# Patient Record
Sex: FEMALE | Race: WHITE | URBAN - METROPOLITAN AREA
[De-identification: names, ages, dates, MRNs, and addresses within clinical notes are randomized per-mention and may not be internally consistent; named-entity substitution may affect disease eponyms.]

---

## 2017-01-03 ENCOUNTER — OFFICE VISIT (OUTPATIENT)
Dept: PLASTIC SURGERY | Facility: CLINIC | Age: 32
End: 2017-01-03

## 2017-01-03 VITALS
SYSTOLIC BLOOD PRESSURE: 123 MMHG | DIASTOLIC BLOOD PRESSURE: 59 MMHG | WEIGHT: 164.1 LBS | OXYGEN SATURATION: 98 % | HEART RATE: 55 BPM | BODY MASS INDEX: 27.34 KG/M2 | TEMPERATURE: 98.2 F | HEIGHT: 65 IN

## 2017-01-03 DIAGNOSIS — F64.0 GENDER DYSPHORIA IN ADOLESCENT AND ADULT: Primary | ICD-10-CM

## 2017-01-03 RX ORDER — TESTOSTERONE CYPIONATE 200 MG/ML
0.3 INJECTION, SOLUTION INTRAMUSCULAR WEEKLY
COMMUNITY

## 2017-01-03 ASSESSMENT — PAIN SCALES - GENERAL: PAINLEVEL: NO PAIN (0)

## 2017-01-03 NOTE — Clinical Note
Laila- needs letter. Jeanine -sending preop worksheet but DO NOT SCHEDULE until PA done. Alison - will need preop mammogram
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
## 2017-01-03 NOTE — NURSING NOTE
"Chief Complaint   Patient presents with     Consult     consult       Filed Vitals:    01/03/17 0915   BP: 123/59   Pulse: 55   Temp: 98.2  F (36.8  C)   Height: 5' 5\"   Weight: 164 lb 1.6 oz   SpO2: 98%       Body mass index is 27.31 kg/(m^2).    Rolando Rivera MA                            "

## 2017-01-23 DIAGNOSIS — F64.0 TRANSSEXUALISM: ICD-10-CM

## 2017-01-23 DIAGNOSIS — F64.0 GENDER DYSPHORIA IN ADOLESCENT AND ADULT: Primary | ICD-10-CM
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
## 2017-02-07 DIAGNOSIS — F64.0 GENDER DYSPHORIA IN ADOLESCENT AND ADULT: Primary | ICD-10-CM

## 2017-02-20 ENCOUNTER — TELEPHONE (OUTPATIENT)
Dept: PLASTIC SURGERY | Facility: CLINIC | Age: 32
End: 2017-02-20

## 2017-02-20 NOTE — TELEPHONE ENCOUNTER
----- Message from Jeanine Soriano sent at 2/14/2017 12:27 PM CST -----  Regarding: FW: PRE AND POST OP QUESTIONS  Contact: [phone]  Alison can you take care of this..    ThanksJeanine  ----- Message -----     From: Vern Chatman     Sent: 2/14/2017  12:25 PM       To: Jeanine Soriano  Subject: PRE AND POST OP QUESTIONS                        Brandyn Lugo is requesting a call back regarding when he should set up appointments for his pre and post op appointments. He also has questions about his FMLA. Please call him at [phone] (ok to leave a message)      ThanksAmber in Call Center

## 2017-03-09 ENCOUNTER — TRANSFERRED RECORDS (OUTPATIENT)
Dept: HEALTH INFORMATION MANAGEMENT | Facility: CLINIC | Age: 32
End: 2017-03-09

## 2017-03-14 ENCOUNTER — OFFICE VISIT (OUTPATIENT)
Dept: PLASTIC SURGERY | Facility: CLINIC | Age: 32
End: 2017-03-14

## 2017-03-14 DIAGNOSIS — F64.0 GENDER DYSPHORIA IN ADOLESCENT AND ADULT: Primary | ICD-10-CM

## 2017-03-14 NOTE — PROGRESS NOTES
PLASTICS PREOP  This is a 31-year-old female-to-male transgender patient who is scheduled for bilateral subcu mastectomies with nipple grafts on 03/29.  He has already had his H&P and mammogram done at [facility] on 03/19, and they are visible on the Care Everywhere, but he will try and have it faxed again for easy access to the hospital.  His mammogram was negative.  We reviewed all the possible risks and complications as well do's and don'ts before and after surgery.  His female partner will be caring for him, but it sounds like she is a little bit anxious and possibly queasy.  We talked about what to expect the day of.  He was given surgical soap for preop shower and should be n.p.o. after midnight.  He only takes a couple meds, bupropion and Claritin, so he will need to take those with a sip of water the morning.  He needs to wear loose fitting clothing with either a zipper or button front top.  We talked about prevention for infection, bleeding, hematoma, seroma formation, DVT and PE and healing issues with regards to activity.  He should not lift more than 5 pounds.  He should limit his use of his upper extremities.  As far as dressings are concerned, the JPs need to be stripped twice daily and recorded.  He will have the On-Q catheters.  There will be Dermabond and Prineo on his incisions to last 2-3 weeks.  He will have nipple graft bolster dressings that will need to remain dry for the week after surgery, so no showering.  Ace wraps for circumferential compression of the thorax to be remapped as needed if too tight or too loose.  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  He had some additional questions as far as when would be okay to travel.  He is taking 6 weeks off since he needs to be able to move patient and does want a lifting restriction.  I think after about 3 weeks he should be fine other than carrying luggage.  He also has an  "interest when he can get back to lying on his front for an extended period of time over 4 hours.  I think after about 8 weeks postop, he should be fine with that.  We talked about medications.  He will receive something for pain such as oxy, Zofran and Atarax and a Z-TUAN.  Unfortunately, he did try oxy once and had a \"rash\" but he is willing to try it again.  Otherwise, he had Vicodin before but had a lot of GI upset.  After we reviewed our usual presentation and all of his questions and concerns, he seemed satisfied and ready to proceed.  We will see him on the 29th.      Total time spent with the patient was 20 minutes, all of which was spent in educating the patient and his caregiver about the upcoming surgery.       "

## 2017-03-14 NOTE — LETTER
3/14/2017       RE: Brandyn Tabor  [address]  [facility] [address]     Dear Colleague,    Thank you for referring your patient, Brandyn Tabor, to the [facility] PLASTIC AND RECONSTRUCTIVE SURGERY at [facility]. Please see a copy of my visit note below.    PLASTICS PREOP  This is a 31-year-old female-to-male transgender patient who is scheduled for bilateral subcu mastectomies with nipple grafts on 03/29.  He has already had his H&P and mammogram done at [facility] on 03/19, and they are visible on the Care Everywhere, but he will try and have it faxed again for easy access to the hospital.  His mammogram was negative.  We reviewed all the possible risks and complications as well do's and don'ts before and after surgery.  His female partner will be caring for him, but it sounds like she is a little bit anxious and possibly queasy.  We talked about what to expect the day of.  He was given surgical soap for preop shower and should be n.p.o. after midnight.  He only takes a couple meds, bupropion and Claritin, so he will need to take those with a sip of water the morning.  He needs to wear loose fitting clothing with either a zipper or button front top.  We talked about prevention for infection, bleeding, hematoma, seroma formation, DVT and PE and healing issues with regards to activity.  He should not lift more than 5 pounds.  He should limit his use of his upper extremities.  As far as dressings are concerned, the JPs need to be stripped twice daily and recorded.  He will have the On-Q catheters.  There will be Dermabond and Prineo on his incisions to last 2-3 weeks.  He will have nipple graft bolster dressings that will need to remain dry for the week after surgery, so no showering.  Ace wraps for circumferential compression of the thorax to be remapped as needed if too tight or too loose.  This patient actually works in [employer] at [employer], so he is  "very comfortable with medical information and cares.  He had some additional questions as far as when would be okay to travel.  He is taking 6 weeks off since he needs to be able to move patient and does want a lifting restriction.  I think after about 3 weeks he should be fine other than carrying luggage.  He also has an interest when he can get back to lying on his front for an extended period of time over 4 hours.  I think after about 8 weeks postop, he should be fine with that.  We talked about medications.  He will receive something for pain such as oxy, Zofran and Atarax and a Z-TUAN.  Unfortunately, he did try oxy once and had a \"rash\" but he is willing to try it again.  Otherwise, he had Vicodin before but had a lot of GI upset.  After we reviewed our usual presentation and all of his questions and concerns, he seemed satisfied and ready to proceed.  We will see him on the 29th.      Total time spent with the patient was 20 minutes, all of which was spent in educating the patient and his caregiver about the upcoming surgery.     Again, thank you for allowing me to participate in the care of your patient.      Sincerely,    Vanesa Love MD      "

## 2017-03-14 NOTE — MR AVS SNAPSHOT
After Visit Summary   3/14/2017    Brandyn Tabor    MRN: [id number]           Patient Information     Date Of Birth          1985        Visit Information        Provider Department      3/14/2017 10:30 AM Vanesa Love MD [facility] Plastic and Reconstructive Surgery        Today's Diagnoses     Gender dysphoria in adolescent and adult    -  1       Follow-ups after your visit        Your next 10 appointments already scheduled     Mar 29, 2017   Procedure with Vanesa Love MD   [facility], [place], Same Day Surgery (--)    [address]  [facility] [id number] [phone]            Apr 04, 2017 10:30 AM CDT   (Arrive by 10:15 AM)   Post-Op with MD CHAN [facility] Plastic and Reconstructive Surgery ([facility] [place])    [address] [address] [phone]              Who to contact     Please call your clinic at [phone] to:    Ask questions about your health    Make or cancel appointments    Discuss your medicines    Learn about your test results    Speak to your doctor   If you have compliments or concerns about an experience at your clinic, or if you wish to file a complaint, please contact [facility] Patient Relations at [phone] or email us at [email or web address]         Additional Information About Your Visit        MyChart Information     [employer] gives you secure access to your electronic health record. If you see a primary care provider, you can also send messages to your care team and make appointments. If you have questions, please call your primary care clinic.  If you do not have a primary care provider, please call [phone] and they will assist you.      [employer] is an electronic gateway that provides easy, online access to your medical records. With [employer], you can request a clinic appointment, read your test results, renew a  prescription or communicate with your care team.     To access your existing account, please contact your [facility] or call [phone] for assistance.        Care EveryWhere ID     This is your Care EveryWhere ID. This could be used by other organizations to access your [facility] medical records  [id number]         Blood Pressure from Last 3 Encounters:   01/03/17 123/59    Weight from Last 3 Encounters:   01/03/17 164 lb 1.6 oz   11/14/06 160 lb              Today, you had the following     No orders found for display       Primary Care Provider Office Phone # Fax #    Elodia Jha [phone]       [facility] [address] [facility] [address]        Thank you!     Thank you for choosing [facility] PLASTIC AND RECONSTRUCTIVE SURGERY  for your care. Our goal is always to provide you with excellent care. Hearing back from our patients is one way we can continue to improve our services. Please take a few minutes to complete the written survey that you may receive in the mail after your visit with us. Thank you!             Your Updated Medication List - Protect others around you: Learn how to safely use, store and throw away your medicines at www.disposemymeds.org.          This list is accurate as of: 3/14/17 11:59 PM.  Always use your most recent med list.                   Brand Name Dispense Instructions for use    ALBUTEROL IN      None Entered       AMBIEN PO      1 TABLET AT BEDTIME AS NEEDED       BUSPIRONE HCL PO      Take 10 mg by mouth       HYDROXYZINE PAMOATE PO      Take 25 mg by mouth       isometheptene-dichloralphenazone-acetaminophen -325 MG per capsule    MIDRIN         order for DME     1    Use as directed       testosterone cypionate 200 MG/ML injection    DEPOTESTOTERONE CYPIONATE     Inject 0.3 mLs into the muscle once a week       vitamin D3 1000 UNITS Caps      Take 1,000 Units by mouth

## 2017-03-29 ENCOUNTER — SURGERY (OUTPATIENT)
Age: 32
End: 2017-03-29

## 2017-03-29 ENCOUNTER — ANESTHESIA EVENT (OUTPATIENT)
Dept: SURGERY | Facility: CLINIC | Age: 32
End: 2017-03-29
Payer: COMMERCIAL

## 2017-03-29 ENCOUNTER — HOSPITAL ENCOUNTER (OUTPATIENT)
Facility: CLINIC | Age: 32
Discharge: HOME | End: 2017-03-29
Attending: SURGERY | Admitting: SURGERY
Payer: COMMERCIAL

## 2017-03-29 ENCOUNTER — ANESTHESIA (OUTPATIENT)
Dept: SURGERY | Facility: CLINIC | Age: 32
End: 2017-03-29
Payer: COMMERCIAL

## 2017-03-29 VITALS
BODY MASS INDEX: 26.81 KG/M2 | SYSTOLIC BLOOD PRESSURE: 122 MMHG | HEART RATE: 63 BPM | HEIGHT: 65 IN | RESPIRATION RATE: 18 BRPM | TEMPERATURE: 98.2 F | OXYGEN SATURATION: 96 % | DIASTOLIC BLOOD PRESSURE: 67 MMHG | WEIGHT: 160.94 LBS

## 2017-03-29 DIAGNOSIS — Z90.13 S/P BILATERAL MASTECTOMY: Primary | ICD-10-CM

## 2017-03-29 LAB — HCG UR QL: NEGATIVE

## 2017-03-29 PROCEDURE — 37000008 ZZH ANESTHESIA TECHNICAL FEE, 1ST 30 MIN: Performed by: SURGERY

## 2017-03-29 PROCEDURE — 36000057 ZZH SURGERY LEVEL 3 1ST 30 MIN - UMMC: Performed by: SURGERY

## 2017-03-29 PROCEDURE — 25000128 H RX IP 250 OP 636: Performed by: NURSE ANESTHETIST, CERTIFIED REGISTERED

## 2017-03-29 PROCEDURE — 88305 TISSUE EXAM BY PATHOLOGIST: CPT | Mod: 26 | Performed by: SURGERY

## 2017-03-29 PROCEDURE — 25800025 ZZH RX 258: Performed by: ANESTHESIOLOGY

## 2017-03-29 PROCEDURE — 25000125 ZZHC RX 250: Performed by: SURGERY

## 2017-03-29 PROCEDURE — 37000009 ZZH ANESTHESIA TECHNICAL FEE, EACH ADDTL 15 MIN: Performed by: SURGERY

## 2017-03-29 PROCEDURE — 27210794 ZZH OR GENERAL SUPPLY STERILE: Performed by: SURGERY

## 2017-03-29 PROCEDURE — 40000170 ZZH STATISTIC PRE-PROCEDURE ASSESSMENT II: Performed by: SURGERY

## 2017-03-29 PROCEDURE — 71000014 ZZH RECOVERY PHASE 1 LEVEL 2 FIRST HR: Performed by: SURGERY

## 2017-03-29 PROCEDURE — 25000128 H RX IP 250 OP 636: Performed by: SURGERY

## 2017-03-29 PROCEDURE — 25000128 H RX IP 250 OP 636: Performed by: ANESTHESIOLOGY

## 2017-03-29 PROCEDURE — 27110038 ZZH RX 271: Performed by: SURGERY

## 2017-03-29 PROCEDURE — 25000132 ZZH RX MED GY IP 250 OP 250 PS 637: Performed by: ANESTHESIOLOGY

## 2017-03-29 PROCEDURE — 25000566 ZZH SEVOFLURANE, EA 15 MIN: Performed by: SURGERY

## 2017-03-29 PROCEDURE — 71000027 ZZH RECOVERY PHASE 2 EACH 15 MINS: Performed by: SURGERY

## 2017-03-29 PROCEDURE — 71000015 ZZH RECOVERY PHASE 1 LEVEL 2 EA ADDTL HR: Performed by: SURGERY

## 2017-03-29 PROCEDURE — 36000059 ZZH SURGERY LEVEL 3 EA 15 ADDTL MIN UMMC: Performed by: SURGERY

## 2017-03-29 PROCEDURE — S0020 INJECTION, BUPIVICAINE HYDRO: HCPCS | Performed by: SURGERY

## 2017-03-29 PROCEDURE — 81025 URINE PREGNANCY TEST: CPT | Performed by: ANESTHESIOLOGY

## 2017-03-29 PROCEDURE — 88305 TISSUE EXAM BY PATHOLOGIST: CPT | Performed by: SURGERY

## 2017-03-29 PROCEDURE — 25000125 ZZHC RX 250: Performed by: NURSE ANESTHETIST, CERTIFIED REGISTERED

## 2017-03-29 PROCEDURE — 25000132 ZZH RX MED GY IP 250 OP 250 PS 637: Performed by: SURGERY

## 2017-03-29 RX ORDER — OXYCODONE HYDROCHLORIDE 5 MG/1
5-10 TABLET ORAL EVERY 4 HOURS PRN
Qty: 50 TABLET | Refills: 0 | Status: SHIPPED | OUTPATIENT
Start: 2017-03-29 | End: 2017-04-04

## 2017-03-29 RX ORDER — LIDOCAINE 40 MG/G
CREAM TOPICAL
Status: DISCONTINUED | OUTPATIENT
Start: 2017-03-29 | End: 2017-03-29 | Stop reason: HOSPADM

## 2017-03-29 RX ORDER — HYDROMORPHONE HYDROCHLORIDE 1 MG/ML
.3-.5 INJECTION, SOLUTION INTRAMUSCULAR; INTRAVENOUS; SUBCUTANEOUS EVERY 10 MIN PRN
Status: DISCONTINUED | OUTPATIENT
Start: 2017-03-29 | End: 2017-03-29 | Stop reason: HOSPADM

## 2017-03-29 RX ORDER — SODIUM CHLORIDE, SODIUM LACTATE, POTASSIUM CHLORIDE, CALCIUM CHLORIDE 600; 310; 30; 20 MG/100ML; MG/100ML; MG/100ML; MG/100ML
INJECTION, SOLUTION INTRAVENOUS CONTINUOUS
Status: DISCONTINUED | OUTPATIENT
Start: 2017-03-29 | End: 2017-03-29 | Stop reason: HOSPADM

## 2017-03-29 RX ORDER — FENTANYL CITRATE 50 UG/ML
INJECTION, SOLUTION INTRAMUSCULAR; INTRAVENOUS PRN
Status: DISCONTINUED | OUTPATIENT
Start: 2017-03-29 | End: 2017-03-29

## 2017-03-29 RX ORDER — CEFAZOLIN SODIUM 2 G/100ML
2 INJECTION, SOLUTION INTRAVENOUS
Status: COMPLETED | OUTPATIENT
Start: 2017-03-29 | End: 2017-03-29

## 2017-03-29 RX ORDER — CEFAZOLIN SODIUM 2 G/100ML
2 INJECTION, SOLUTION INTRAVENOUS
Status: DISCONTINUED | OUTPATIENT
Start: 2017-03-29 | End: 2017-03-29 | Stop reason: HOSPADM

## 2017-03-29 RX ORDER — GLYCOPYRROLATE 0.2 MG/ML
INJECTION, SOLUTION INTRAMUSCULAR; INTRAVENOUS PRN
Status: DISCONTINUED | OUTPATIENT
Start: 2017-03-29 | End: 2017-03-29

## 2017-03-29 RX ORDER — ONDANSETRON 4 MG/1
4 TABLET, ORALLY DISINTEGRATING ORAL EVERY 30 MIN PRN
Status: DISCONTINUED | OUTPATIENT
Start: 2017-03-29 | End: 2017-03-29 | Stop reason: HOSPADM

## 2017-03-29 RX ORDER — NALOXONE HYDROCHLORIDE 0.4 MG/ML
.1-.4 INJECTION, SOLUTION INTRAMUSCULAR; INTRAVENOUS; SUBCUTANEOUS
Status: DISCONTINUED | OUTPATIENT
Start: 2017-03-29 | End: 2017-03-29 | Stop reason: HOSPADM

## 2017-03-29 RX ORDER — ONDANSETRON 4 MG/1
4 TABLET, ORALLY DISINTEGRATING ORAL EVERY 8 HOURS PRN
Qty: 20 TABLET | Refills: 1 | Status: SHIPPED | OUTPATIENT
Start: 2017-03-29 | End: 2017-06-06

## 2017-03-29 RX ORDER — LIDOCAINE HYDROCHLORIDE 20 MG/ML
INJECTION, SOLUTION INFILTRATION; PERINEURAL PRN
Status: DISCONTINUED | OUTPATIENT
Start: 2017-03-29 | End: 2017-03-29

## 2017-03-29 RX ORDER — CEFAZOLIN SODIUM 1 G/3ML
1 INJECTION, POWDER, FOR SOLUTION INTRAMUSCULAR; INTRAVENOUS SEE ADMIN INSTRUCTIONS
Status: DISCONTINUED | OUTPATIENT
Start: 2017-03-29 | End: 2017-03-29 | Stop reason: HOSPADM

## 2017-03-29 RX ORDER — NEOSTIGMINE METHYLSULFATE 1 MG/ML
VIAL (ML) INJECTION PRN
Status: DISCONTINUED | OUTPATIENT
Start: 2017-03-29 | End: 2017-03-29

## 2017-03-29 RX ORDER — ONDANSETRON 2 MG/ML
INJECTION INTRAMUSCULAR; INTRAVENOUS PRN
Status: DISCONTINUED | OUTPATIENT
Start: 2017-03-29 | End: 2017-03-29

## 2017-03-29 RX ORDER — MEPERIDINE HYDROCHLORIDE 25 MG/ML
12.5 INJECTION INTRAMUSCULAR; INTRAVENOUS; SUBCUTANEOUS
Status: DISCONTINUED | OUTPATIENT
Start: 2017-03-29 | End: 2017-03-29 | Stop reason: HOSPADM

## 2017-03-29 RX ORDER — OXYCODONE HYDROCHLORIDE 5 MG/1
5 TABLET ORAL ONCE
Status: COMPLETED | OUTPATIENT
Start: 2017-03-29 | End: 2017-03-29

## 2017-03-29 RX ORDER — BUPIVACAINE HYDROCHLORIDE 5 MG/ML
INJECTION, SOLUTION PERINEURAL PRN
Status: DISCONTINUED | OUTPATIENT
Start: 2017-03-29 | End: 2017-03-29 | Stop reason: HOSPADM

## 2017-03-29 RX ORDER — PROPOFOL 10 MG/ML
INJECTION, EMULSION INTRAVENOUS PRN
Status: DISCONTINUED | OUTPATIENT
Start: 2017-03-29 | End: 2017-03-29

## 2017-03-29 RX ORDER — ONDANSETRON 2 MG/ML
4 INJECTION INTRAMUSCULAR; INTRAVENOUS EVERY 30 MIN PRN
Status: DISCONTINUED | OUTPATIENT
Start: 2017-03-29 | End: 2017-03-29 | Stop reason: HOSPADM

## 2017-03-29 RX ORDER — ACETAMINOPHEN 325 MG/1
975 TABLET ORAL ONCE
Status: COMPLETED | OUTPATIENT
Start: 2017-03-29 | End: 2017-03-29

## 2017-03-29 RX ORDER — AZITHROMYCIN 250 MG/1
250 TABLET, FILM COATED ORAL DAILY
Qty: 6 TABLET | Refills: 0 | Status: SHIPPED | OUTPATIENT
Start: 2017-03-29 | End: 2017-04-04

## 2017-03-29 RX ORDER — GABAPENTIN 100 MG/1
300 CAPSULE ORAL ONCE
Status: COMPLETED | OUTPATIENT
Start: 2017-03-29 | End: 2017-03-29

## 2017-03-29 RX ADMIN — NEOSTIGMINE METHYLSULFATE 3.5 MG: 1 INJECTION INTRAMUSCULAR; INTRAVENOUS; SUBCUTANEOUS at 12:20

## 2017-03-29 RX ADMIN — ONDANSETRON 4 MG: 2 INJECTION INTRAMUSCULAR; INTRAVENOUS at 12:40

## 2017-03-29 RX ADMIN — ACETAMINOPHEN 975 MG: 325 TABLET, FILM COATED ORAL at 07:00

## 2017-03-29 RX ADMIN — FENTANYL CITRATE 50 MCG: 50 INJECTION, SOLUTION INTRAMUSCULAR; INTRAVENOUS at 11:26

## 2017-03-29 RX ADMIN — GABAPENTIN 300 MG: 300 CAPSULE ORAL at 07:01

## 2017-03-29 RX ADMIN — CEFAZOLIN SODIUM 2 G: 2 INJECTION, SOLUTION INTRAVENOUS at 08:02

## 2017-03-29 RX ADMIN — SODIUM CHLORIDE, POTASSIUM CHLORIDE, SODIUM LACTATE AND CALCIUM CHLORIDE: 600; 310; 30; 20 INJECTION, SOLUTION INTRAVENOUS at 12:20

## 2017-03-29 RX ADMIN — PROCHLORPERAZINE EDISYLATE 5 MG: 5 INJECTION INTRAMUSCULAR; INTRAVENOUS at 14:02

## 2017-03-29 RX ADMIN — BUPIVACAINE HYDROCHLORIDE 4 ML: 5 INJECTION, SOLUTION PERINEURAL at 09:46

## 2017-03-29 RX ADMIN — Medication 50 MG: at 07:43

## 2017-03-29 RX ADMIN — MIDAZOLAM HYDROCHLORIDE 2 MG: 1 INJECTION, SOLUTION INTRAMUSCULAR; INTRAVENOUS at 07:38

## 2017-03-29 RX ADMIN — HYDROMORPHONE HYDROCHLORIDE 0.3 MG: 1 INJECTION, SOLUTION INTRAMUSCULAR; INTRAVENOUS; SUBCUTANEOUS at 13:10

## 2017-03-29 RX ADMIN — Medication: at 12:32

## 2017-03-29 RX ADMIN — GLYCOPYRROLATE 0.5 MG: 0.2 INJECTION, SOLUTION INTRAMUSCULAR; INTRAVENOUS at 12:20

## 2017-03-29 RX ADMIN — ONDANSETRON 4 MG: 2 INJECTION INTRAMUSCULAR; INTRAVENOUS at 12:20

## 2017-03-29 RX ADMIN — SODIUM CHLORIDE, POTASSIUM CHLORIDE, SODIUM LACTATE AND CALCIUM CHLORIDE: 600; 310; 30; 20 INJECTION, SOLUTION INTRAVENOUS at 08:28

## 2017-03-29 RX ADMIN — LIDOCAINE HYDROCHLORIDE 60 MG: 20 INJECTION, SOLUTION INFILTRATION; PERINEURAL at 07:43

## 2017-03-29 RX ADMIN — MEPERIDINE HYDROCHLORIDE 12.5 MG: 25 INJECTION, SOLUTION INTRAMUSCULAR; INTRAVENOUS; SUBCUTANEOUS at 13:00

## 2017-03-29 RX ADMIN — OXYCODONE HYDROCHLORIDE 5 MG: 5 TABLET ORAL at 15:30

## 2017-03-29 RX ADMIN — PROPOFOL 140 MG: 10 INJECTION, EMULSION INTRAVENOUS at 07:43

## 2017-03-29 RX ADMIN — HYDROMORPHONE HYDROCHLORIDE 0.5 MG: 1 INJECTION, SOLUTION INTRAMUSCULAR; INTRAVENOUS; SUBCUTANEOUS at 13:34

## 2017-03-29 RX ADMIN — Medication 25 MG: at 09:45

## 2017-03-29 RX ADMIN — Medication 25 MG: at 08:55

## 2017-03-29 RX ADMIN — FENTANYL CITRATE 50 MCG: 50 INJECTION, SOLUTION INTRAMUSCULAR; INTRAVENOUS at 08:40

## 2017-03-29 RX ADMIN — GENTAMICIN SULFATE 1 BOTTLE: 40 INJECTION, SOLUTION INTRAMUSCULAR; INTRAVENOUS at 08:21

## 2017-03-29 RX ADMIN — Medication 50 MG: at 07:46

## 2017-03-29 RX ADMIN — FENTANYL CITRATE 100 MCG: 50 INJECTION, SOLUTION INTRAMUSCULAR; INTRAVENOUS at 07:43

## 2017-03-29 RX ADMIN — CEFAZOLIN SODIUM 1 G: 2 INJECTION, SOLUTION INTRAVENOUS at 10:09

## 2017-03-29 RX ADMIN — SODIUM CHLORIDE, POTASSIUM CHLORIDE, SODIUM LACTATE AND CALCIUM CHLORIDE: 600; 310; 30; 20 INJECTION, SOLUTION INTRAVENOUS at 07:38

## 2017-03-29 NOTE — BRIEF OP NOTE
Brief Op Note    Pre Op Diagnosis: female to male transgender, gender dysphoria  Post Op Diagnosis: same  Procedure: bilateral subcutaneous mastectomies with nipple grafts. OnQ  Anesthesia:GET  Surgeon: Angélica Love MD  Assistant: none  EBL: 50 ml  IV fluids:1900 mls LR  Urine output:100 mls  Counts: correct  Specimens: breast tissue sent to path for histologic examination  Drain: MILIND x 2  Condition: stable  Findings:na      Angélica Love MD

## 2017-03-29 NOTE — IP AVS SNAPSHOT
MRN:[id number]                      After Visit Summary   3/29/2017    Brandyn Tabor    MRN: [id number]           Thank you!     Thank you for choosing [facility] for your care. Our goal is always to provide you with excellent care. Hearing back from our patients is one way we can continue to improve our services. Please take a few minutes to complete the written survey that you may receive in the mail after you visit with us. Thank you!        Patient Information     Date Of Birth          1985        About your hospital stay     You were admitted on:  March 29, 2017 You last received care in the:   PACU    You were discharged on:  March 29, 2017        Reason for your hospital stay       S/p bilateral subcutaneous mastectomies with nipple grafts. OnQ pain catheters.  Tolerated under GA.  OK to dc home per anesthesia criteria.                  Who to Call     For medical emergencies, please call 911.  For non-urgent questions about your medical care, please call your primary care provider or clinic, [phone]  For questions related to your surgery, please call your surgery clinic        Attending Provider     Provider Specialty    Vanesa Love MD Plastic Surgery       Primary Care Provider Office Phone # Fax #    Elodia Jha [phone]       [facility] [address] [facility] [address]         When to contact your care team       Call Plastic Surgery Clinic during daytime hours (Alison: [phone], OR Bettye: [phone]), OR the hospital  for nights/ weekends ([phone]) to speak with plastic surgery on-call resident IF you have any of the following: temperature >102.5, increased bleeding noted in drains or under skin, reactions to meds, reactions to pain pump (tingling around lips or ringing in ears), any problems with drains/pain catheters/or dressings.                  After Care Instructions     Activity     "   Your activity upon discharge: no heavy lifting > 5 lbs x 3 weeks. AVOID excessive/ extreme use of upper body/ extremities x 3 weeks. OK to do gentle movements for daily cares.  AVOID direct trauma to surgical sites.  OK to sleep on your back or modified side position (may be uncomfortable with incisions and drains on the side). CANNOT sleep on stomach for at least the first 2 weeks.  May want to consider sleeping with pillows to prevent from rolling over.   Some patients prefer to sleep in a recliner to prevent rolling, but elevation is not required.            Diet       Follow this diet upon discharge: Advance to a regular diet as tolerated.  Drink plenty of fluids to help prevent constipation.  Get plenty of protein, vitamins and minerals (fruits and veggies)  to help with healing.  Can resume usual preop meds and supplements as tolerated.            Monitor and record       MILIND drain output EVERY morning and EVERY night.  Usually between 30-50 mls per day, but don't be alarmed is more or less. May not be equal between sides. Will probably drop off when pain pump is empty.  Usually fluid looks like cherry Koolaid at first, then Hawaiian punch color, then peach tea over time.  May notice protein \"clots\" that look like \"worms\" in the tubing. Do not be alarmed -this is just precipitated protein from the fluid that is weeping from your raw tissues, much like what you see when you scrape your knee.  We DO want to be notified IF: there is increasing amount of fresh bright red blood that rapidly fills the suction bulb after emptying. OR if blood collecting under the skin and causing significant painful swelling on one side (expanding hematoma).            Supplies       List the supplies the pt needs to go home:  PLEASE send supplies for MILIND drain cares.  Please instruct caregivers on drain cares.  Please send home with spare ACE wraps from OR.  THANK YOU            Tubes and drains       You are going home with the " following tubes or drains: MILIND.  Follow these instructions  to care for your tube.  STRIP tubing and MEASURE/RECORD output Qam & Qpm.  Please bring output record to follow up appointment.  MAKE SURE NURSES INSTRUCT ON DRAIN CARES BEFORE GOING HOME.            Wound care and dressings       Instructions to care for your wound at home: as directed.  Keep nipple graft dressings DRY. NO SHOWERS until after follow up appointment.   OK to rewrap ACE if too tight or too loose.   Do NOT mess with dressings underneath ACE wrap or nipple grafts dressings.                  Follow-up Appointments     Follow Up and recommended labs and tests       Follow up with me,  Vaneas Love, within 1 week at [address] [place] [address]. to evaluate after surgery.  No follow up labs or test are needed.                  Your next 10 appointments already scheduled     Apr 04, 2017 10:30 AM CDT   (Arrive by 10:15 AM)   Post-Op with Vanesa Love MD   [facility] Plastic and Reconstructive Surgery ([facility])    [address] [place] [address]  4th Floor  [facility] [id number] [phone]              Further instructions from your care team       Same-Day Surgery   Adult Discharge Orders & Instructions     For 24 hours after surgery:  1. Get plenty of rest.  A responsible adult must stay with you for at least 24 hours after you leave the hospital.   2. Pain medication can slow your reflexes. Do not drive or use heavy equipment.  If you have weakness or tingling, don't drive or use heavy equipment until this feeling goes away.  3. Mixing alcohol and pain medication can cause dizziness and slow your breathing. It can even be fatal. Do not drink alcohol while taking pain medication.  4. Avoid strenuous or risky activities.  Ask for help when climbing stairs.   5. You may feel lightheaded.  If so, sit for a few minutes before standing.  Have someone help you get up.   6. If you have nausea (feel sick to your stomach),  drink only clear liquids such as apple juice, ginger ale, broth or 7-Up.  Rest may also help.  Be sure to drink enough fluids.  Move to a regular diet as you feel able. Take pain medications with a small amount of solid food, such as toast or crackers, to avoid nausea.   7. A slight fever is normal. Call the doctor if your fever is over 100 F (37.7 C) (taken under the tongue) or lasts longer than 24 hours.  8. You may have a dry mouth, muscle aches, trouble sleeping or a sore throat.  These symptoms should go away after 24 hours.  9. Do not make important or legal decisions.   Pain Management:      1. Take pain medication (if prescribed) for pain as directed by your physician.        2. WARNING: If the pain medication you have been prescribed contains Tylenol  (acetaminophen), DO NOT take additional doses of Tylenol (acetaminophen).     Call your doctor for any of the followin.  Signs of infection (fever, growing tenderness at the surgery site, severe pain, a large amount of drainage or bleeding, foul-smelling drainage, redness, swelling).    2.  It has been over 8 to 10 hours since surgery and you are still not able to urinate (pee).    3.  Headache for over 24 hours.    4.  Numbness, tingling or weakness the day after surgery (if you had spinal anesthesia).  To contact a doctor, call _____________________________________ or:      [phone] and ask for the Resident On Call for:          __________________________________________ (answered 24 hours a day)      Emergency Department:  [facility]: [phone]  [facility]: [phone]      ON-Q  C-bloc Continuous Nerve Block Discharge Instructions  The Nerve Block:       Your anesthesiologist performed a nerve block (a procedure that blocks pain to only a specific area) by inserting a small catheter (tube) in your body.  This catheter is connected to tubing and to a pump that will help control your pain.  The  Medicine/Rate______________________________________________    The pump is shaped like a balloon and is filled with     medicine that causes numbness or loss of sensation to help control your pain.  The pain pump DOES NOT contain narcotics.      The medicine in the pump may alter your ability to feel changes in temperature or pressure.  Depending on where the catheter was placed, it may affect your ability to control movement.  After the first few hours you may get some soreness as well as movement back; this is normal.    The Pump      DO NOT SQUEEZE THE PUMP.     The pump delivers medicine at a very slow rate.    You will NOT see the medicine moving through the tubing.    As the medicine is delivered, the pump ball will slowly become smaller.    It may take a day or so before you notice a change in the size and look of the pump.    Depending on the size of your pump, it may take 2 - 5 days to give all the medicine.    The middle part of the pump may look like an apple core when empty.  Managing Your Pain    The continuous nerve block infusion may not block all of the pain from your surgery (and the benefits of the pump can vary from patient to patient).  It is important that you take the pain medicines prescribed by your surgeon if you need them.      If you continue to have difficulty with your pain control, please page or call the anesthesiologist.  Caring for Your Pump at Home    Wear the pump on the outside of clothing - away from your skin and cold therapy (ice packs).  The delivery rate is accurate only at room temperature.    Make sure the white clamp on the tubing remains open (moves freely on the tubing).    Make sure there are no kinks in the tubing.    DO NOT tape or cover up the filter.    Protect the pump from sunlight and heat.    When sleeping:   o DO NOT place the pump underneath the bed covers where the pump may become too warm.  o DO NOT place the pump on the floor or hang the pump on a bed post  as these situations may cause the tubing to get tangled and get pulled out.    Bathing/Showering:    o We recommend taking sponge baths until the pump is removed.  o Avoid getting the area where the catheter enters your body wet.  o DO NOT let water get in the filter.  o DO NOT submerge the pump in water.  Activity     DO NOT drive or operate heavy machinery if the nerve block affects an extremity    DO NOT bear weight on the affected extremity until sensation and motion return and directed by your physician    Elevate affected extremity; pillows work well for elevation.    Take care to avoid objects that may put pressure on or cause trauma to the limb.  Be careful when placing hot or cold objects on the numb area.    For Upper Extremity Nerve Blocks, using the non-operative extremity, you may do range of motion exercises hourly while awake unless directed otherwise.    For Knee Surgery patients with a Femoral or Adductor Canal catheter you must have an Immobilizer on at all times when up until the catheter is removed and full sensation and strength have returned.    For Lower Extremity Nerve Blocks make sure someone is with you the first time you attempt to place full weight on your operative side.  The Infusion    The infusion will be started by the Surgical Center.  DO NOT turn the infusion off unless your anesthesiologist has told you to do so.    DO NOT change the flow rate of the infusion unless your anesthesiologist told you to do so.  Changing the flow rate without your doctor s instruction may result in the wrong dose of medicine, which could cause serious injury.    If your anesthesiologist told you to change the rate of your infusion:  o Flip open the clear cover on the select-a-flow device.  o Turn the white key clockwise on the select-a-flow dial to the instructed rate.  (The rate of the infusion should line up with the black arrow at the top of the controller.)    o Listen for the click when you move  the dial.  o The key may be removed from the select-a-flow dial, or the plastic cover on the device may be zip tied shut to ensure safety.  Removing the Catheter    When the pump is empty or if you have been told to stop the infusion you can remove the catheter.  Follow these steps::  o Wash your hands.  o Clamp the tubing (squeeze the white clamp until you hear or feel a click).  o Remove the clear dressing that covers the tubing.  o Grasp the catheter close to the skin and gently pull.  If you meet resistance, STOP pulling and page or call your anesthesiologist.  o DO NOT cut or forcefully remove the catheter.  o After removal, check the end of the catheter for a dark tip.  If you DO NOT see a dark tip, page or call your anesthesiologist.  o Apply firm pressure over the site until oozing stops.  Wash the area with soap and water, dry with a clean towel and then cover with a bandage.   o The pump is not reusable or refillable.  Dispose of it in the trash and wash your hands.  Troubleshooting    Tubing Comes Out From Skin:  If the tube accidentally comes out, check the end of the tubing for a dark tip.    If you see a dark tip simply discard it and use the pain pills prescribed to you by your surgeon.    If you DON T see a dark tip, page or call the anesthesiologist.    Tubing Disconnection:  If the tubing accidentally becomes disconnected from the pump, DO NOT reconnect the pump to the tubing.  It may have been contaminated with germs.  Close the tubing clamp and immediately page or call your anesthesiologist.    Fluid Leaking:  If fluid is leaking from the site catheter insertion site, close the white clamp on the tubing and page or call your anesthesiologist.    Immediately report the following to your anesthesiologist:    Redness, warmth, swelling, or tenderness at the site the tubing was inserted    Increase in pain    Fever, chills, sweats    Bowel or bladder changes    Difficulty breathing    Dizziness,  lightheadedness    Blurred vision    Ringing or buzzing in your ears    Metal taste in your mouth    Numbness and/or tingling around your mouth, fingers or toes    Drowsiness    Confusion    Trouble removing the tubing    Dark tip is not present when tubing is removed    Notifying your Anesthesiologist  o Page:  Dial [phone], then enter [phone].  You will be prompted to enter your phone number and then the # sign.  The anesthesiologist will call you back.  o Call:  Dial [phone].  Ask to speak to the anesthesiologist on call for the [facility].                     Updated 1/2016  .u           Rev. 10/2014   Caring for Your Randal-Salguero Drain    You have been discharged with a Randal-Salguero drainage tube. This tube drains fluid from your incision, helping prevent swelling and reducing the risk for infection. The tube is held in place by a few stitches. The drain will be removed when your doctor determines you no longer need it and when the amount of drainage decreases. The color and amount of fluid varies. Right after surgery, the fluid may be bright red and may become clearer over time.   Dressing:    Keep the skin around the tube dry.    If you have a dressing, change it every day.   o Wash your hands.  o Remove the old bandage. Do not use scissors-you may accidentally cut the tube.  o Check for any redness, swelling, drainage, or broken stitches. (Call your doctor with any of these findings).  o Wash your hands again.  o Wet a cotton swab (Q-tip) and clean around the incision and the tube site. Use normal saline solution (salt and water) or soap and water. Start at the tube site and move outward in a circular motion.   o Pat dry.  o Put a new bandage on the incision and tube site. Make the bandage large enough to cover the whole incision area.   o Tape the bandage in place.  o Throw out old materials and wash your hands.   Home Care:    Tape the tube to the skin below the bandage. Make  sure to keep some slack in the tube to keep it from pulling out.     DO NOT sleep on the same side as the tube.    Secure the tube and bulb inside your clothing with a safety pin. This helps keep the tube from being pulled out.     Keep the bulb compressed at all times, except when you empty it.    Empty your drain at least twice a day. Empty it more often if the drain is full.   o Lift the opening of the drain.  o Drain the fluid into a measuring cup.  o Record the amount of fluid each time you empty. Share the information with your doctor at your follow-up visit.   o Squeeze the bulb with your hands until you hear air coming out of the bulb.  o Close the opening.     Tape plastic wrap over the bandage and tube site when you shower.      Stripping  the tube helps keep blood clots from blocking the tube.                         ONLY DO THIS IF YOUR MD INSTRUCTED YOU TO DO SO!  o Hold the tubing where it leaves the skin with one hand. This keeps it from pulling on the skin.  o Pinch the tubing with the thumb and first finger of your other hand.   o Slowly and firmly pull your thumb and first finger down the tube (squeezing the tube between your fingers). Keep squeezing the tube as you run your fingers towards the bulb. If the pulling hurts or feels like it is coming out of the skin, STOP. Begin again more gently.  o Let go of the tubing with both hands. If the tube is still blocked, repeat these steps three or four times. Make sure that the bulb is compressed so it creates suction.  When to call your doctor:    New or increased pain around the tube    Redness, warmth, or swelling around the incision or tube    Drainage that is foul smelling    Vomiting    Fever over 101 F degrees    Fluid leaking around the tube    Incision seems not to be healing    Stitches become loose    The tube falls out    Drainage that changes from light pick to dark red    A sudden increase or decrease in the amount of drainage (over 30  "ml).  Your drainage record:  Date Time Bulb 1: Amount of drainage (ml or cc) Bulb 2: Amount of drainage (ml or cc) Notes                                                                                            Rev. 4/2014      Pending Results     Date and Time Order Name Status Description    3/29/2017 1031 Surgical pathology exam In process             Admission Information     Date & Time Provider Department Dept. Phone    3/29/2017 Vanesa Love MD  PACU [phone]      Your Vitals Were     Blood Pressure Pulse Temperature Respirations Height Weight    126/66 63 98.6  F (37  C) (Oral) 15 1.651 m (5' 5\") 73 kg (160 lb 15 oz)    Pulse Oximetry BMI (Body Mass Index)                96% 26.78 kg/m2          [employer] Information     [employer] gives you secure access to your electronic health record. If you see a primary care provider, you can also send messages to your care team and make appointments. If you have questions, please call your primary care clinic.  If you do not have a primary care provider, please call [phone] and they will assist you.        Care EveryWhere ID     This is your Care EveryWhere ID. This could be used by other organizations to access your [place] medical records  [id number]           Review of your medicines      START taking        Dose / Directions    azithromycin 250 MG tablet   Commonly known as:  ZITHROMAX Z-TUAN   Used for:  S/P bilateral mastectomy        Dose:  250 mg   Take 1 tablet (250 mg) by mouth daily Take 2 tabs 1st day   Quantity:  6 tablet   Refills:  0       ondansetron 4 MG ODT tab   Commonly known as:  ZOFRAN ODT   Used for:  S/P bilateral mastectomy        Dose:  4 mg   Take 1 tablet (4 mg) by mouth every 8 hours as needed for nausea   Quantity:  20 tablet   Refills:  1       oxyCODONE 5 MG IR tablet   Commonly known as:  ROXICODONE   Used for:  S/P bilateral mastectomy        Dose:  5-10 mg   Take 1-2 tablets (5-10 mg) by mouth every 4 hours as " needed for moderate to severe pain   Quantity:  50 tablet   Refills:  0         CONTINUE these medicines which have NOT CHANGED        Dose / Directions    ALBUTEROL IN        PRN   Refills:  0       AMBIEN PO        1 TABLET AT BEDTIME AS NEEDED   Refills:  0       BUSPIRONE HCL PO        Dose:  10 mg   Take 10 mg by mouth daily   Refills:  0       HYDROXYZINE PAMOATE PO        Dose:  25 mg   Take 25 mg by mouth every 4 hours as needed   Refills:  0       isometheptene-dichloralphenazone-acetaminophen -325 MG per capsule   Commonly known as:  MIDRIN        Take by mouth as needed   Refills:  0       order for DME   Used for:  Sprain of ankle, unspecified site        Use as directed   Quantity:  1   Refills:  0       testosterone cypionate 200 MG/ML injection   Commonly known as:  DEPOTESTOTERONE CYPIONATE        Dose:  0.3 mL   Inject 0.3 mLs into the muscle once a week   Refills:  0       vitamin D3 1000 UNITS Caps        Dose:  1000 Units   Take 1,000 Units by mouth   Refills:  0            Where to get your medicines      These medications were sent to [facility] [address]  [address] [address]     Phone:  [phone]     azithromycin 250 MG tablet    ondansetron 4 MG ODT tab         Some of these will need a paper prescription and others can be bought over the counter. Ask your nurse if you have questions.     Bring a paper prescription for each of these medications     oxyCODONE 5 MG IR tablet                Protect others around you: Learn how to safely use, store and throw away your medicines at www.disposemymeds.org.             Medication List: This is a list of all your medications and when to take them. Check marks below indicate your daily home schedule. Keep this list as a reference.      Medications           Morning Afternoon Evening Bedtime As Needed    ALBUTEROL IN   PRN                                AMBIEN PO   1 TABLET AT  BEDTIME AS NEEDED                                azithromycin 250 MG tablet   Commonly known as:  ZITHROMAX Z-TUAN   Take 1 tablet (250 mg) by mouth daily Take 2 tabs 1st day                                BUSPIRONE HCL PO   Take 10 mg by mouth daily                                HYDROXYZINE PAMOATE PO   Take 25 mg by mouth every 4 hours as needed                                isometheptene-dichloralphenazone-acetaminophen -325 MG per capsule   Commonly known as:  MIDRIN   Take by mouth as needed                                ondansetron 4 MG ODT tab   Commonly known as:  ZOFRAN ODT   Take 1 tablet (4 mg) by mouth every 8 hours as needed for nausea                                order for DME   Use as directed                                oxyCODONE 5 MG IR tablet   Commonly known as:  ROXICODONE   Take 1-2 tablets (5-10 mg) by mouth every 4 hours as needed for moderate to severe pain                                testosterone cypionate 200 MG/ML injection   Commonly known as:  DEPOTESTOTERONE CYPIONATE   Inject 0.3 mLs into the muscle once a week                                vitamin D3 1000 UNITS Caps   Take 1,000 Units by mouth

## 2017-03-29 NOTE — DISCHARGE INSTRUCTIONS
Same-Day Surgery   Adult Discharge Orders & Instructions     For 24 hours after surgery:  1. Get plenty of rest.  A responsible adult must stay with you for at least 24 hours after you leave the hospital.   2. Pain medication can slow your reflexes. Do not drive or use heavy equipment.  If you have weakness or tingling, don't drive or use heavy equipment until this feeling goes away.  3. Mixing alcohol and pain medication can cause dizziness and slow your breathing. It can even be fatal. Do not drink alcohol while taking pain medication.  4. Avoid strenuous or risky activities.  Ask for help when climbing stairs.   5. You may feel lightheaded.  If so, sit for a few minutes before standing.  Have someone help you get up.   6. If you have nausea (feel sick to your stomach), drink only clear liquids such as apple juice, ginger ale, broth or 7-Up.  Rest may also help.  Be sure to drink enough fluids.  Move to a regular diet as you feel able. Take pain medications with a small amount of solid food, such as toast or crackers, to avoid nausea.   7. A slight fever is normal. Call the doctor if your fever is over 100 F (37.7 C) (taken under the tongue) or lasts longer than 24 hours.  8. You may have a dry mouth, muscle aches, trouble sleeping or a sore throat.  These symptoms should go away after 24 hours.  9. Do not make important or legal decisions.   Pain Management:      1. Take pain medication (if prescribed) for pain as directed by your physician.        2. WARNING: If the pain medication you have been prescribed contains Tylenol  (acetaminophen), DO NOT take additional doses of Tylenol (acetaminophen).     Call your doctor for any of the followin.  Signs of infection (fever, growing tenderness at the surgery site, severe pain, a large amount of drainage or bleeding, foul-smelling drainage, redness, swelling).    2.  It has been over 8 to 10 hours since surgery and you are still not able to urinate (pee).    3.   Headache for over 24 hours.    4.  Numbness, tingling or weakness the day after surgery (if you had spinal anesthesia).  To contact a doctor, call _____________________________________ or:      [phone] and ask for the Resident On Call for:          __________________________________________ (answered 24 hours a day)      Emergency Department:  [facility]: [phone]  [facility]: [phone]      ON-Q  C-bloc Continuous Nerve Block Discharge Instructions  The Nerve Block:       Your anesthesiologist performed a nerve block (a procedure that blocks pain to only a specific area) by inserting a small catheter (tube) in your body.  This catheter is connected to tubing and to a pump that will help control your pain.  The Medicine/Rate______________________________________________    The pump is shaped like a balloon and is filled with     medicine that causes numbness or loss of sensation to help control your pain.  The pain pump DOES NOT contain narcotics.      The medicine in the pump may alter your ability to feel changes in temperature or pressure.  Depending on where the catheter was placed, it may affect your ability to control movement.  After the first few hours you may get some soreness as well as movement back; this is normal.    The Pump      DO NOT SQUEEZE THE PUMP.     The pump delivers medicine at a very slow rate.    You will NOT see the medicine moving through the tubing.    As the medicine is delivered, the pump ball will slowly become smaller.    It may take a day or so before you notice a change in the size and look of the pump.    Depending on the size of your pump, it may take 2 - 5 days to give all the medicine.    The middle part of the pump may look like an apple core when empty.  Managing Your Pain    The continuous nerve block infusion may not block all of the pain from your surgery (and the benefits of the pump can vary from patient to patient).   It is important that you take the pain medicines prescribed by your surgeon if you need them.      If you continue to have difficulty with your pain control, please page or call the anesthesiologist.  Caring for Your Pump at Home    Wear the pump on the outside of clothing - away from your skin and cold therapy (ice packs).  The delivery rate is accurate only at room temperature.    Make sure the white clamp on the tubing remains open (moves freely on the tubing).    Make sure there are no kinks in the tubing.    DO NOT tape or cover up the filter.    Protect the pump from sunlight and heat.    When sleeping:   o DO NOT place the pump underneath the bed covers where the pump may become too warm.  o DO NOT place the pump on the floor or hang the pump on a bed post as these situations may cause the tubing to get tangled and get pulled out.    Bathing/Showering:    o We recommend taking sponge baths until the pump is removed.  o Avoid getting the area where the catheter enters your body wet.  o DO NOT let water get in the filter.  o DO NOT submerge the pump in water.  Activity     DO NOT drive or operate heavy machinery if the nerve block affects an extremity    DO NOT bear weight on the affected extremity until sensation and motion return and directed by your physician    Elevate affected extremity; pillows work well for elevation.    Take care to avoid objects that may put pressure on or cause trauma to the limb.  Be careful when placing hot or cold objects on the numb area.    For Upper Extremity Nerve Blocks, using the non-operative extremity, you may do range of motion exercises hourly while awake unless directed otherwise.    For Knee Surgery patients with a Femoral or Adductor Canal catheter you must have an Immobilizer on at all times when up until the catheter is removed and full sensation and strength have returned.    For Lower Extremity Nerve Blocks make sure someone is with you the first time you attempt  to place full weight on your operative side.  The Infusion    The infusion will be started by the Surgical Center.  DO NOT turn the infusion off unless your anesthesiologist has told you to do so.    DO NOT change the flow rate of the infusion unless your anesthesiologist told you to do so.  Changing the flow rate without your doctor s instruction may result in the wrong dose of medicine, which could cause serious injury.    If your anesthesiologist told you to change the rate of your infusion:  o Flip open the clear cover on the select-a-flow device.  o Turn the white key clockwise on the select-a-flow dial to the instructed rate.  (The rate of the infusion should line up with the black arrow at the top of the controller.)    o Listen for the click when you move the dial.  o The key may be removed from the select-a-flow dial, or the plastic cover on the device may be zip tied shut to ensure safety.  Removing the Catheter    When the pump is empty or if you have been told to stop the infusion you can remove the catheter.  Follow these steps::  o Wash your hands.  o Clamp the tubing (squeeze the white clamp until you hear or feel a click).  o Remove the clear dressing that covers the tubing.  o Grasp the catheter close to the skin and gently pull.  If you meet resistance, STOP pulling and page or call your anesthesiologist.  o DO NOT cut or forcefully remove the catheter.  o After removal, check the end of the catheter for a dark tip.  If you DO NOT see a dark tip, page or call your anesthesiologist.  o Apply firm pressure over the site until oozing stops.  Wash the area with soap and water, dry with a clean towel and then cover with a bandage.   o The pump is not reusable or refillable.  Dispose of it in the trash and wash your hands.  Troubleshooting    Tubing Comes Out From Skin:  If the tube accidentally comes out, check the end of the tubing for a dark tip.    If you see a dark tip simply discard it and use the  pain pills prescribed to you by your surgeon.    If you DON T see a dark tip, page or call the anesthesiologist.    Tubing Disconnection:  If the tubing accidentally becomes disconnected from the pump, DO NOT reconnect the pump to the tubing.  It may have been contaminated with germs.  Close the tubing clamp and immediately page or call your anesthesiologist.    Fluid Leaking:  If fluid is leaking from the site catheter insertion site, close the white clamp on the tubing and page or call your anesthesiologist.    Immediately report the following to your anesthesiologist:    Redness, warmth, swelling, or tenderness at the site the tubing was inserted    Increase in pain    Fever, chills, sweats    Bowel or bladder changes    Difficulty breathing    Dizziness, lightheadedness    Blurred vision    Ringing or buzzing in your ears    Metal taste in your mouth    Numbness and/or tingling around your mouth, fingers or toes    Drowsiness    Confusion    Trouble removing the tubing    Dark tip is not present when tubing is removed    Notifying your Anesthesiologist  o Page:  Dial [phone], then enter [phone].  You will be prompted to enter your phone number and then the # sign.  The anesthesiologist will call you back.  o Call:  Dial [phone].  Ask to speak to the anesthesiologist on call for the [facility].                     Updated 1/2016  .u           Rev. 10/2014   Caring for Your Randal-Salguero Drain    You have been discharged with a Randal-Salguero drainage tube. This tube drains fluid from your incision, helping prevent swelling and reducing the risk for infection. The tube is held in place by a few stitches. The drain will be removed when your doctor determines you no longer need it and when the amount of drainage decreases. The color and amount of fluid varies. Right after surgery, the fluid may be bright red and may become clearer over time.   Dressing:    Keep the skin around the tube  dry.    If you have a dressing, change it every day.   o Wash your hands.  o Remove the old bandage. Do not use scissors-you may accidentally cut the tube.  o Check for any redness, swelling, drainage, or broken stitches. (Call your doctor with any of these findings).  o Wash your hands again.  o Wet a cotton swab (Q-tip) and clean around the incision and the tube site. Use normal saline solution (salt and water) or soap and water. Start at the tube site and move outward in a circular motion.   o Pat dry.  o Put a new bandage on the incision and tube site. Make the bandage large enough to cover the whole incision area.   o Tape the bandage in place.  o Throw out old materials and wash your hands.   Home Care:    Tape the tube to the skin below the bandage. Make sure to keep some slack in the tube to keep it from pulling out.     DO NOT sleep on the same side as the tube.    Secure the tube and bulb inside your clothing with a safety pin. This helps keep the tube from being pulled out.     Keep the bulb compressed at all times, except when you empty it.    Empty your drain at least twice a day. Empty it more often if the drain is full.   o Lift the opening of the drain.  o Drain the fluid into a measuring cup.  o Record the amount of fluid each time you empty. Share the information with your doctor at your follow-up visit.   o Squeeze the bulb with your hands until you hear air coming out of the bulb.  o Close the opening.     Tape plastic wrap over the bandage and tube site when you shower.      Stripping  the tube helps keep blood clots from blocking the tube.                         ONLY DO THIS IF YOUR MD INSTRUCTED YOU TO DO SO!  o Hold the tubing where it leaves the skin with one hand. This keeps it from pulling on the skin.  o Pinch the tubing with the thumb and first finger of your other hand.   o Slowly and firmly pull your thumb and first finger down the tube (squeezing the tube between your fingers). Keep  squeezing the tube as you run your fingers towards the bulb. If the pulling hurts or feels like it is coming out of the skin, STOP. Begin again more gently.  o Let go of the tubing with both hands. If the tube is still blocked, repeat these steps three or four times. Make sure that the bulb is compressed so it creates suction.  When to call your doctor:    New or increased pain around the tube    Redness, warmth, or swelling around the incision or tube    Drainage that is foul smelling    Vomiting    Fever over 101 F degrees    Fluid leaking around the tube    Incision seems not to be healing    Stitches become loose    The tube falls out    Drainage that changes from light pick to dark red    A sudden increase or decrease in the amount of drainage (over 30 ml).  Your drainage record:  Date Time Bulb 1: Amount of drainage (ml or cc) Bulb 2: Amount of drainage (ml or cc) Notes                                                                                            Rev. 4/2014

## 2017-03-29 NOTE — IP AVS SNAPSHOT
[place] [id number] [facility] [address]    Phone:  [phone]                                       After Visit Summary   3/29/2017    Brandyn Tabor    MRN: [id number]           After Visit Summary Signature Page     I have received my discharge instructions, and my questions have been answered. I have discussed any challenges I see with this plan with the nurse or doctor.    ..........................................................................................................................................  Patient/Patient Representative Signature      ..........................................................................................................................................  Patient Representative Print Name and Relationship to Patient    ..................................................               ................................................  Date                                            Time    ..........................................................................................................................................  Reviewed by Signature/Title    ...................................................              ..............................................  Date                                                            Time

## 2017-03-29 NOTE — ANESTHESIA POSTPROCEDURE EVALUATION
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    Anesthesia Type:  General, ETT    Note:  Anesthesia Post Evaluation    Patient location during evaluation: PACU  Patient participation: Able to fully participate in evaluation  Level of consciousness: awake and alert  Pain management: adequate  Airway patency: patent  Cardiovascular status: acceptable  Respiratory status: acceptable  Hydration status: acceptable  PONV: none     Anesthetic complications: None          Last vitals:  Vitals:    03/29/17 1400 03/29/17 1415 03/29/17 1430   BP: 120/79 110/79 126/66   Pulse:      Resp: 12  15   Temp: 36.8  C (98.2  F)  37  C (98.6  F)   SpO2: 99%  96%         Electronically Signed By: Kelly King MD  March 29, 2017  2:56 PM

## 2017-03-29 NOTE — ANESTHESIA PREPROCEDURE EVALUATION
Anesthesia Plan      History & Physical Review  History and physical reviewed and following examination; no interval change.    ASA Status:  1 .    NPO Status:  > 8 hours    Plan for General and ETT with Intravenous and Propofol induction. Maintenance will be Inhalation.    PONV prophylaxis:  Ondansetron (or other 5HT-3) and Promethazine or metoclopramide       Postoperative Care  Postoperative pain management:  IV analgesics, Oral pain medications and Multi-modal analgesia.      Consents  Anesthetic plan, risks, benefits and alternatives discussed with:  Patient..                          .

## 2017-03-29 NOTE — ANESTHESIA CARE TRANSFER NOTE
Patient: Brandyn Tabor    Procedure(s):  Bilateral Subcutaneous Mastectomies, Nipple Graft, On-Q - Wound Class: I-Clean    Diagnosis: Gender Dysphoria In Adolescent and Adult   Diagnosis Additional Information: No value filed.    Anesthesia Type:   General, ETT     Note:  Airway :Face Mask  Patient transferred to:PACU  Comments: Arrived in PACU, report to RN, vitals stable, patient comfortable.        Vitals: (Last set prior to Anesthesia Care Transfer)    CRNA VITALS  3/29/2017 1200 - 3/29/2017 1240      3/29/2017             Resp Rate (set): 10                Electronically Signed By: ROSSY Álvarez CRNA  March 29, 2017  12:40 PM

## 2017-03-29 NOTE — OR NURSING
Admitted to Phase 2. Transferred into a recliner. Became nauseated, reclined in chair and resting. Will continue to observe

## 2017-04-01 LAB — COPATH REPORT: NORMAL

## 2017-04-04 ENCOUNTER — OFFICE VISIT (OUTPATIENT)
Dept: PLASTIC SURGERY | Facility: CLINIC | Age: 32
End: 2017-04-04

## 2017-04-04 VITALS
OXYGEN SATURATION: 97 % | TEMPERATURE: 99 F | DIASTOLIC BLOOD PRESSURE: 72 MMHG | BODY MASS INDEX: 26.56 KG/M2 | HEART RATE: 93 BPM | HEIGHT: 65 IN | WEIGHT: 159.4 LBS | SYSTOLIC BLOOD PRESSURE: 136 MMHG

## 2017-04-04 DIAGNOSIS — Z98.890 POSTOPERATIVE STATE: Primary | ICD-10-CM

## 2017-04-04 ASSESSMENT — PAIN SCALES - GENERAL: PAINLEVEL: MODERATE PAIN (5)

## 2017-04-04 NOTE — NURSING NOTE
"Chief Complaint   Patient presents with     RECHECK     f/u       Vitals:    04/04/17 1008   BP: 136/72   BP Location: Left arm   Patient Position: Chair   Cuff Size: Adult Regular   Pulse: 93   Temp: 99  F (37.2  C)   SpO2: 97%   Weight: 159 lb 6.4 oz   Height: 5' 5\"       Body mass index is 26.53 kg/(m^2).      Rolando Rivera MA                            "

## 2017-04-04 NOTE — MR AVS SNAPSHOT
After Visit Summary   4/4/2017    Brandyn Tabor    MRN: [id number]           Patient Information     Date Of Birth          1985        Visit Information        Provider Department      4/4/2017 10:30 AM Vanesa Love MD [facility] Plastic and Reconstructive Surgery        Care Instructions    Showering    For the first week, take a shower with your back facing the spray. After you shower, change your dressing.    Changing your bandage    Change your bandage every day for 7 to 10 days.    Please continue to use the ACE wrap for 7 days.    Gather these items:      MicroKlenz anti-germ spray      Adaptic wound cover, scissors      Gauze      Tegaderm bandage    Steps    1. Carefully remove the old dressing.    2. Spray a piece of gauze with MicroKlenz. Gently pat your nipples with the gauze. Do not rub.    3. Cut a piece of Adaptic (wound cover that doesn t stick) into 4 pieces.    4. Place one piece over each nipple.    5. Fold 2x2 gauze in half and place over Adaptic.    6. Place Tegaderm (clear bandage) over the top    Glue strips    The glue strips over the wound will start falling off over time. You may cut glue strip pieces that are peeling off. After 3 weeks, you may peel off any glue strips still in place.      Post Surgery Nipple FAQ s    1) Once I'm no longer using nipple dressings or the Ace wrap, do I just not put anything on my nipples? No you don't have to.  Remember it is ok if each side is behaving differently in healing.   Should I be moisturizing them with anything, or is it better not to?  Doesn't matter but you certainly can.     2) When can I let the spray from my shower head hit my chest directly? When you are done with the nipple dressings you may shower like you normally do  And should I be washing my chest with soap, or just rinsing it? You may wash with soap but remember soap can dry the skin and we don t recommend daily soap for your skin except for  critical areas. You may wash with soap but treat the nipples gently, so no washcloth or anything else rough for 8 weeks     3) When can I start wearing shirts that pull over my head instead of buttoning/zipping in the front? Whenever it's is not sore to do so, probably within 3-4 weeks.     4) I forget what you said about how much I can lift when. 5 pound lifting restriction for 3-4 weeks. Let your body be your guide, increase in increments. If frequent 50 pound lifting is typical, you can resume this again at 8 weeks from surgery     5) When can I start reaching my arms over my head? 2-3 weeks, gently     6) When can I start swimming again?  6-8 weeks     7) I've been sleeping on my back, but I'm wondering when it's okay to sleep on my side.  3-4 weeks as tolerated     8) When can I start to massage my scars? Don t start until at least 3 weeks post op but then you can start gently massaging your scars whenever you like     9) What will reduce the scar? Scars will lighten with time, approximately in one year. Sun exposure however will darken them so if you are concerned use sunscreen on the scars. Other scar reducing products are unproven but okay to try if you want.  Scarring depends on genetics, tension on the tissues during activities.    There are various options none proven and none covered by insurance.   Silicone strips - oils - vitamin E - Mederma - Frankincense, etc.      10) When are the nipple done sloughing? Old nipple skin will peel off when new graft underneath has healed usually 2-3 weeks post op.     11) What happens if there is drainage? Keep clean and dry cover with gauze and bandaid. If the nipples becomes very red and warm call our office    12) You can resume normal activities at 6-8 weeks. If an activity causes pain don't do it and wait a few days before trying again. Let your body be your guide, increase activities in increments.   Always call the nurse at [phone] or [phone] if you  are concerned.           Follow-ups after your visit        Your next 10 appointments already scheduled     Apr 04, 2017 10:30 AM CDT   (Arrive by 10:15 AM)   Post-Op with Vanesa Love MD   [facility] Plastic and Reconstructive Surgery ([facility])    [address] [place] [id number] [phone]            Jun 06, 2017 10:00 AM CDT   (Arrive by 9:45 AM)   Post-Op with Vanesa Love MD   [facility] Plastic and Reconstructive Surgery ([facility])    [address] [place] [id number] [phone]              Who to contact     Please call your clinic at [phone] to:    Ask questions about your health    Make or cancel appointments    Discuss your medicines    Learn about your test results    Speak to your doctor   If you have compliments or concerns about an experience at your clinic, or if you wish to file a complaint, please contact [facility] Patient Relations at [phone] or email us at [email or web address]         Additional Information About Your Visit        NuAxhart Information     S[employer]got gives you secure access to your electronic health record. If you see a primary care provider, you can also send messages to your care team and make appointments. If you have questions, please call your primary care clinic.  If you do not have a primary care provider, please call [phone] and they will assist you.      USMD is an electronic gateway that provides easy, online access to your medical records. With USMD, you can request a clinic appointment, read your test results, renew a prescription or communicate with your care team.     To access your existing account, please contact your [facility] or call [phone] for assistance.        Care EveryWhere ID     This is your Care EveryWhere ID. This could be used by other  "organizations to access your [place] medical records  [id number]        Your Vitals Were     Pulse Temperature Height Pulse Oximetry BMI (Body Mass Index)       93 99  F (37.2  C) 5' 5\" 97% 26.53 kg/m2        Blood Pressure from Last 3 Encounters:   04/04/17 136/72   03/29/17 122/67   01/03/17 123/59    Weight from Last 3 Encounters:   04/04/17 159 lb 6.4 oz   03/29/17 160 lb 15 oz   01/03/17 164 lb 1.6 oz              Today, you had the following     No orders found for display       Primary Care Provider Office Phone # Fax #    Elodia Jha [phone]       [facility] [address] [facility] [address]        Thank you!     Thank you for choosing [facility] PLASTIC AND RECONSTRUCTIVE SURGERY  for your care. Our goal is always to provide you with excellent care. Hearing back from our patients is one way we can continue to improve our services. Please take a few minutes to complete the written survey that you may receive in the mail after your visit with us. Thank you!             Your Updated Medication List - Protect others around you: Learn how to safely use, store and throw away your medicines at www.disposemymeds.org.          This list is accurate as of: 4/4/17 10:25 AM.  Always use your most recent med list.                   Brand Name Dispense Instructions for use    ALBUTEROL IN      PRN       AMBIEN PO      1 TABLET AT BEDTIME AS NEEDED       BUSPIRONE HCL PO      Take 10 mg by mouth daily       HYDROXYZINE PAMOATE PO      Take 25 mg by mouth every 4 hours as needed       isometheptene-dichloralphenazone-acetaminophen -325 MG per capsule    MIDRIN     Take by mouth as needed       ondansetron 4 MG ODT tab    ZOFRAN ODT    20 tablet    Take 1 tablet (4 mg) by mouth every 8 hours as needed for nausea       order for DME     1    Use as directed       testosterone cypionate 200 MG/ML injection    DEPOTESTOTERONE CYPIONATE     Inject 0.3 mLs into the muscle once a " week       vitamin D3 1000 UNITS Caps      Take 1,000 Units by mouth

## 2017-04-04 NOTE — PATIENT INSTRUCTIONS
Showering    For the first week, take a shower with your back facing the spray. After you shower, change your dressing.    Changing your bandage    Change your bandage every day for 7 to 10 days.    Please continue to use the ACE wrap for 7 days.    Gather these items:      MicroKlenz anti-germ spray      Adaptic wound cover, scissors      Gauze      Tegaderm bandage    Steps    1. Carefully remove the old dressing.    2. Spray a piece of gauze with MicroKlenz. Gently pat your nipples with the gauze. Do not rub.    3. Cut a piece of Adaptic (wound cover that doesn t stick) into 4 pieces.    4. Place one piece over each nipple.    5. Fold 2x2 gauze in half and place over Adaptic.    6. Place Tegaderm (clear bandage) over the top    Glue strips    The glue strips over the wound will start falling off over time. You may cut glue strip pieces that are peeling off. After 3 weeks, you may peel off any glue strips still in place.      Post Surgery Nipple FAQ s    1) Once I'm no longer using nipple dressings or the Ace wrap, do I just not put anything on my nipples? No you don't have to.  Remember it is ok if each side is behaving differently in healing.   Should I be moisturizing them with anything, or is it better not to?  Doesn't matter but you certainly can.     2) When can I let the spray from my shower head hit my chest directly? When you are done with the nipple dressings you may shower like you normally do  And should I be washing my chest with soap, or just rinsing it? You may wash with soap but remember soap can dry the skin and we don t recommend daily soap for your skin except for critical areas. You may wash with soap but treat the nipples gently, so no washcloth or anything else rough for 8 weeks     3) When can I start wearing shirts that pull over my head instead of buttoning/zipping in the front? Whenever it's is not sore to do so, probably within 3-4 weeks.     4) I forget what you said about how much I  can lift when. 5 pound lifting restriction for 3-4 weeks. Let your body be your guide, increase in increments. If frequent 50 pound lifting is typical, you can resume this again at 8 weeks from surgery     5) When can I start reaching my arms over my head? 2-3 weeks, gently     6) When can I start swimming again?  6-8 weeks     7) I've been sleeping on my back, but I'm wondering when it's okay to sleep on my side.  3-4 weeks as tolerated     8) When can I start to massage my scars? Don t start until at least 3 weeks post op but then you can start gently massaging your scars whenever you like     9) What will reduce the scar? Scars will lighten with time, approximately in one year. Sun exposure however will darken them so if you are concerned use sunscreen on the scars. Other scar reducing products are unproven but okay to try if you want.  Scarring depends on genetics, tension on the tissues during activities.    There are various options none proven and none covered by insurance.   Silicone strips - oils - vitamin E - Mederma - Frankincense, etc.      10) When are the nipple done sloughing? Old nipple skin will peel off when new graft underneath has healed usually 2-3 weeks post op.     11) What happens if there is drainage? Keep clean and dry cover with gauze and bandaid. If the nipples becomes very red and warm call our office    12) You can resume normal activities at 6-8 weeks. If an activity causes pain don't do it and wait a few days before trying again. Let your body be your guide, increase activities in increments.   Always call the nurse at [phone] or [phone] if you are concerned.

## 2017-04-04 NOTE — LETTER
4/4/2017       RE: Brandyn Tabor  [address] [address]     Dear Colleague,    Thank you for referring your patient, Brandyn Tabor, to the [facility] PLASTIC AND RECONSTRUCTIVE SURGERY at [facility]. Please see a copy of my visit note below.    PLASTICS POSTOP     Dictation on: 04/04/2017  1:09 PM by: SYDNIE ESTRADA [[id number]]         Again, thank you for allowing me to participate in the care of your patient.      Sincerely,    Vanesa Estrada MD

## 2017-04-04 NOTE — LETTER
4/4/2017      RE: Brandyn Tabor  [address] [address]       PLASTICS POSTOP    HISTORY OF PRESENT ILLNESS:  This is a 32-year-old biological female transitioning to male who had bilateral mastectomies subq with nipple grafts and On-Q catheter placement last week.  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  Interestingly, he had some medication reactions to possibly his oxycodone, possibly Tylenol and possibly the Z-You within the first 2-3 postop days, so for the most part, he just had the On-Q catheters and using Motrin for pain control even though we usually prefer to avoid Motrin for the first week.  They lifted the On-Q catheters themselves yesterday and he noted some difference after that was completed.  MILIND has put out probably 15-20 cc in the last couple of days and certainly dropped off with the removal of the On-Q.  Drains today were removed without difficulty.  The nipple graft bolster dressings were removed.  He has 100% nipple take.  Unfortunately, when we were removing the dressings it took a portion of the Dermabond Prineo from the right lateral incision.  This was dressed with the reapplication just Dermabond without the tape.  Overall, he has an excellent result with good contour and great symmetry as far as the grafts and the incisions are concerned.  There is mild to moderate edema, not much ecchymosis.  At this point, he was instructed on how to do nipple graft dressings for the next week on a daily basis and continue to use the Ace for the rest of the week.  He is to continue the activity limitations for another 2 weeks and then he can start to slowly increase the range of motion, etc.  As far as scars, once the Prineo has come off in the next week or two, he can then start massaging or using silicone, whatever he so chooses.  Preliminary photos were taken with his verbal permission today.   We will see him back in 2 months for formal pictures and just to assess his scarring and his overall healing.  If they need any other help with regard to paperwork and return to work, etc., they can contact the clinic.  He did not need any refills today.            Vanesa Love MD

## 2017-04-04 NOTE — PROGRESS NOTES
PLASTICS POSTOP    HISTORY OF PRESENT ILLNESS:  This is a 32-year-old biological female transitioning to male who had bilateral mastectomies subq with nipple grafts and On-Q catheter placement last week.  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  Interestingly, he had some medication reactions to possibly his oxycodone, possibly Tylenol and possibly the Z-You within the first 2-3 postop days, so for the most part, he just had the On-Q catheters and using Motrin for pain control even though we usually prefer to avoid Motrin for the first week.  They lifted the On-Q catheters themselves yesterday and he noted some difference after that was completed.  MILIND has put out probably 15-20 cc in the last couple of days and certainly dropped off with the removal of the On-Q.  Drains today were removed without difficulty.  The nipple graft bolster dressings were removed.  He has 100% nipple take.  Unfortunately, when we were removing the dressings it took a portion of the Dermabond Prineo from the right lateral incision.  This was dressed with the reapplication just Dermabond without the tape.  Overall, he has an excellent result with good contour and great symmetry as far as the grafts and the incisions are concerned.  There is mild to moderate edema, not much ecchymosis.  At this point, he was instructed on how to do nipple graft dressings for the next week on a daily basis and continue to use the Ace for the rest of the week.  He is to continue the activity limitations for another 2 weeks and then he can start to slowly increase the range of motion, etc.  As far as scars, once the Prineo has come off in the next week or two, he can then start massaging or using silicone, whatever he so chooses.  Preliminary photos were taken with his verbal permission today.  We will see him back in 2 months for formal pictures and just to assess his scarring and  his overall healing.  If they need any other help with regard to paperwork and return to work, etc., they can contact the clinic.  He did not need any refills today.

## 2017-05-10 ENCOUNTER — TELEPHONE (OUTPATIENT)
Dept: PLASTIC SURGERY | Facility: CLINIC | Age: 32
End: 2017-05-10

## 2017-05-10 NOTE — TELEPHONE ENCOUNTER
"Brandyn is calling for Dr. Love.  He is post bilateral mastectomy and nipple graft 3/29/17.  He noticed \"something on one of my nipple grafts\".  Saturday noticed a red area.  Was more active on Saturday so thought irritation from that.  \"Last night it didn't look right. Area opened up and pus came out.\"    Discharge described as whitish with a little blood, thick, didn't smell.  Area was red, not hot.  Had a little pain, felt better after drained.  He cleaned it up and put a dressing on.  Hasn't looked at it today.  Has been busy and it isn't bothering him.    Will route to Dr. Ivan Rosas's clinic nurse.  "

## 2017-05-11 NOTE — TELEPHONE ENCOUNTER
Nurse returned call to patient on 5/11/2017.  Patient reported that area of concern was no longer red, no longer had any drainage or leakage and no pain to report.  Patient also stated that he felt it may have been a pimple or an ingrown hair.  Nurse let patient know to call again if there are any changes in redness, fever, swelling or if there is any foul smelling drainage again.

## 2017-06-06 ENCOUNTER — OFFICE VISIT (OUTPATIENT)
Dept: PLASTIC SURGERY | Facility: CLINIC | Age: 32
End: 2017-06-06

## 2017-06-06 DIAGNOSIS — Z98.890 POSTOPERATIVE STATE: Primary | ICD-10-CM

## 2017-06-06 DIAGNOSIS — Z90.13 S/P BILATERAL MASTECTOMY: ICD-10-CM
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
## 2017-06-06 NOTE — MR AVS SNAPSHOT
After Visit Summary   6/6/2017    Brandyn Tabor    MRN: [id number]           Patient Information     Date Of Birth          1985        Visit Information        Provider Department      6/6/2017 10:00 AM Vanesa Love MD [facility] Plastic and Reconstructive Surgery        Today's Diagnoses     Postoperative state    -  1    S/P bilateral mastectomy           Follow-ups after your visit        Who to contact     Please call your clinic at [phone] to:    Ask questions about your health    Make or cancel appointments    Discuss your medicines    Learn about your test results    Speak to your doctor   If you have compliments or concerns about an experience at your clinic, or if you wish to file a complaint, please contact [facility] Patient Relations at [phone] or email us at [email or web address]         Additional Information About Your Visit        MyChart Information     [employer] gives you secure access to your electronic health record. If you see a primary care provider, you can also send messages to your care team and make appointments. If you have questions, please call your primary care clinic.  If you do not have a primary care provider, please call [phone] and they will assist you.      Intent HQ is an electronic gateway that provides easy, online access to your medical records. With [employer], you can request a clinic appointment, read your test results, renew a prescription or communicate with your care team.     To access your existing account, please contact your [facility] or call [phone] for assistance.        Care EveryWhere ID     This is your Care EveryWhere ID. This could be used by other organizations to access your [place] medical records  [id number]         Blood Pressure from Last 3 Encounters:   04/04/17 136/72   03/29/17 122/67   01/03/17 123/59    Weight from Last 3  Encounters:   04/04/17 159 lb 6.4 oz   03/29/17 160 lb 15 oz   01/03/17 164 lb 1.6 oz              Today, you had the following     No orders found for display       Primary Care Provider Office Phone # Fax #    Elodia Jha [phone]       [facility] [address] [facility] [address]        Equal Access to Services     KIAN STACY : Hadii aad ku hadasho Soomaali, waaxda luqadaha, qaybta kaalmada adeegyada, waxay idiin hayaan adeeg khlivia lamariannen ah. So [facility] [phone].    ATENCIÓN: Si gavi wong, tiene a garner disposición servicios gratuitos de asistencia lingüística. Llame al [phone].    We comply with applicable federal civil rights laws and [place] laws. We do not discriminate on the basis of race, color, national origin, age, disability sex, sexual orientation or gender identity.            Thank you!     Thank you for choosing [facility] PLASTIC AND RECONSTRUCTIVE SURGERY  for your care. Our goal is always to provide you with excellent care. Hearing back from our patients is one way we can continue to improve our services. Please take a few minutes to complete the written survey that you may receive in the mail after your visit with us. Thank you!             Your Updated Medication List - Protect others around you: Learn how to safely use, store and throw away your medicines at www.disposemymeds.org.          This list is accurate as of: 6/6/17 11:59 PM.  Always use your most recent med list.                   Brand Name Dispense Instructions for use Diagnosis    ALBUTEROL IN      PRN        AMBIEN PO      1 TABLET AT BEDTIME AS NEEDED        BUSPIRONE HCL PO      Take 10 mg by mouth daily        HYDROXYZINE PAMOATE PO      Take 25 mg by mouth every 4 hours as needed        isometheptene-dichloralphenazone-acetaminophen -325 MG per capsule    MIDRIN     Take by mouth as needed        order for DME     1    Use as directed    Sprain of ankle, unspecified  site       testosterone cypionate 200 MG/ML injection    DEPOTESTOTERONE     Inject 0.3 mLs into the muscle once a week        vitamin D3 1000 UNITS Caps      Take 1,000 Units by mouth
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
## 2017-09-03 ENCOUNTER — HEALTH MAINTENANCE LETTER (OUTPATIENT)
Age: 32
End: 2017-09-03

## 2019-11-07 ENCOUNTER — HEALTH MAINTENANCE LETTER (OUTPATIENT)
Age: 34
End: 2019-11-07

## 2020-02-23 ENCOUNTER — HEALTH MAINTENANCE LETTER (OUTPATIENT)
Age: 35
End: 2020-02-23

## 2020-12-06 ENCOUNTER — HEALTH MAINTENANCE LETTER (OUTPATIENT)
Age: 35
End: 2020-12-06

## 2021-09-25 ENCOUNTER — HEALTH MAINTENANCE LETTER (OUTPATIENT)
Age: 36
End: 2021-09-25

## 2022-01-15 ENCOUNTER — HEALTH MAINTENANCE LETTER (OUTPATIENT)
Age: 37
End: 2022-01-15

## 2022-04-22 NOTE — OP NOTE
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      PROCEDURE:  Bilateral subcutaneous mastectomies with nipple grafts.  On-Q catheter placement.      ANESTHESIA:  GET.      ESTIMATED BLOOD LOSS:  50 mL.      IV FLUIDS:  1900 mL.      URINE OUTPUT:  100 mL.      COUNTS:  Correct.      COMPLICATIONS:  None.      DRAINS:  MILIND x2.      INDICATIONS:  Brandyn Tabor is a 32-year-old female-to-male transgender patient who is on testosterone and passes very well as male with a very striking male pattern truncal hair distribution.  Brandyn met WPATH criteria as well as insurance criteria for top surgery.  Due to the patient's breast volume and nipple position, he was a candidate for double incision and nipple grafting.      DESCRIPTION OF PROCEDURE:  The patient was seen in the preoperative waiting area.  The operative sites were marked.  This included the sternal notch, the sternal midline and inframammary folds with lateral extensions and median nipple areolar line with a mid humeral line transposed onto the anterior chest.  Informed consent was obtained after reviewing the possible risks and complications including but not limited to following:  Infection, bleeding, hematoma, seroma formation, poor healing, possible dehiscence, possible spitting sutures, possible hypertrophic scarring, and possible fat necrosis, possible partial or total failure of nipple graft, altered sensation of the chest wall, possible residual deformities, need for further surgery and possible injury to surrounding neurovascular and musculoskeletal as well as intrathoracic and intra-axillary structures.  Anesthetic risks such as DVT, PE and cardiopulmonary arrest.  The patient was then brought to the operating room and placed supine on the OR table.  After general anesthesia was administered, Mcintyre was  placed.  The patient already had sequential compression devices on his lower extremities prior to induction.  The arms were secured to arm boards with 6-inch Ace wraps.  Additional padding and security straps were placed across the thighs and forelegs.  The chest breast area was then prepped and draped in the usual sterile fashion using ChloraPrep.      After timeout was taken and the proper patient and procedure were identified, our inframammary fold incisions were made using a peak plasma blade on the right side and a #10 blade and Corsa Technologyl[employer] cautery on the left.  We left approximately 2 cm subcutaneous tissue along the IMF incision before beveling down to the pectoral fascia.  The breast mound was then elevated off the pec fascia up towards the clavicle, medially towards the sternal midline and laterally around the border of the lateral pec.  This allowed us to pull the chest breast skin inferiorly and arvin it where it overlapped with the IMF incision.  The nipple areolar complex NAC was then harvested sharply with a #10 blade.  This was kept on the backtable wrapped in triple antibiotic-soaked gauze and labeled per side.  Our superior skin flap was then fashioned by making a similar type incision.  The thickness of the flap was approximately 2 cm and the removed breast tissue was weighed on the backtable and then ultimately sent to pathology.  Once we had done this on both sides, the incisions were temporarily skin stapled and the patient was put into a sitting position on the OR table.  This allowed us to make adjustments to gain better symmetry as far as the level of the incision and shape of incision and ultimately eradication of dog ears both medially and laterally.  This met that we had actually joined the incision in the midline due to excess tissue in this area.  Of note, his left breast was closer to the midline on the right preoperatively and the patient was informed that this might be a possibility.   The patient was then returned to supine position and all these actual tissues and adjustments were resected and added to the specimens.  When we were happy with the contour of our flaps as well as the symmetry of our incisions, the dissection pockets were irrigated with triple antibiotic solution.  Hemostasis was achieved with cautery.  Then #15 round MILIND drains were introduced through lateral stab wound incisions and secured with 3-0 nylon suture.  These were draped along the inferior aspect of the pocket.  Dual 10-inch On-Q catheters were then placed percutaneously from the epigastrium and draped along the superior aspect of the dissection pocket.  These were secured with benzoin and Tegaderm.  Definitive closure was then achieved with 3-0 Vicryl deep dermal buried sutures followed by 4-0 Vicryl running subcuticular suture.  This was then dressed with Dermabond Prineo.  The nipple grafts were then thinned significantly using iris scissors.  This was to facilitate better take of his graft.  Nipple templates were then cut out and the patient was again put into a sitting position.  The most aesthetic positioning for the graft was then obtained, both by measurements and by eye.  This was approximately 2.25 cm above the IMF incision and about 11.5 to 12  cm from the midline.  The skin was then de-epithelialized sharply with a #15 blade.  The nipples were reduced as were the areolae to fit the recipient sites and then anchored with 5-0 fast absorbing gut interrupted and running cuticular suture.  The central nipple and also tacked down to the wound bed.  Bolster dressings consisting of Xeroform gauze with antibiotic-soaked cotton balls were held in place with skin staples and 2-0 silk tie-overs.  The entire area was then dressed with Kerlix rolls and double long 6-inch Ace was wrapped circumferentially around the thorax.  JPs were trimmed and put to bulb suction.  The On-Q catheters were attached to the reservoir  consisting of 550 mL of 0.2% ropivacaine to be delivered at 2 mL per hour per catheter over the next 5 days.  Mcintyre was left in.  The patient was extubated and transferred to a stretcher and taken to the recovery room in satisfactory condition having tolerated the procedure without difficulty or complication.  Of note, specimen sent to the lab were right breast 454 grams and left breast 368 grams.         AMMY ESTRADA MD             D: 2017 10:34   T: 2017 11:04   MT:       Name:     GEOVANNY VILLA   MRN:      [id number]        Account:        [id number]   :      1985           Procedure Date: 2017      Document: [id number]

## 2022-12-26 ENCOUNTER — HEALTH MAINTENANCE LETTER (OUTPATIENT)
Age: 37
End: 2022-12-26

## 2023-04-22 ENCOUNTER — HEALTH MAINTENANCE LETTER (OUTPATIENT)
Age: 38
End: 2023-04-22

## (undated) DEVICE — STPL SKIN 35W APPOSE 8886803712

## (undated) DEVICE — SOL ADH LIQUID BENZOIN SWAB 0.6ML C1544

## (undated) DEVICE — SOL WATER IRRIG 1000ML BOTTLE 2F7114

## (undated) DEVICE — ESU PENCIL W/SMOKE EVAC NEPTUNE STRYKER 0703-046-000

## (undated) DEVICE — CATH TRAY FOLEY SURESTEP 16FR WDRAIN BAG STLK LATEX A300316A

## (undated) DEVICE — BNDG ELASTIC 6"X5YDS UNSTERILE 6611-60

## (undated) DEVICE — ESU BLADE PEAK PLASMA 3.0S PS210-030S

## (undated) DEVICE — DRSG COTTON BALL 6PK LCB62

## (undated) DEVICE — SU VICRYL 4-0 PS-1 18" UND J682G

## (undated) DEVICE — GLOVE PROTEXIS MICRO 6.5  2D73PM65

## (undated) DEVICE — LINEN TOWEL PACK X5 5464

## (undated) DEVICE — BLADE KNIFE SURG 15 371115

## (undated) DEVICE — PEN MARKING SKIN W/LABELS 31145918

## (undated) DEVICE — LINEN ORTHO PACK 5446

## (undated) DEVICE — LIGHT HANDLE X2

## (undated) DEVICE — DRSG TEGADERM 2 3/8X2 3/4" 1624W

## (undated) DEVICE — TUBING SUCTION MEDI-VAC 1/4"X20' N620A

## (undated) DEVICE — DRAPE LAP TRANSVERSE 29421

## (undated) DEVICE — SPONGE LAP 18X18" X8435

## (undated) DEVICE — NDL 18GA 1.5" 305196

## (undated) DEVICE — GOWN XLG DISP 9545

## (undated) DEVICE — BLADE KNIFE SURG 10 371110

## (undated) DEVICE — SU SILK 2-0 SH 30" K833H

## (undated) DEVICE — ESU GROUND PAD UNIVERSAL W/O CORD

## (undated) DEVICE — SU PLAIN FAST ABSORB 5-0 PC-1 18" 1915G

## (undated) DEVICE — SU DERMABOND PRINEO CLR602US

## (undated) DEVICE — DRAPE SLEEVE 599

## (undated) DEVICE — SUCTION MANIFOLD DORNOCH ULTRA CART UL-CL500

## (undated) DEVICE — PREP CHLORAPREP 26ML TINTED ORANGE  260815

## (undated) DEVICE — COVER CAMERA IN-LIGHT DISP LT-C02

## (undated) DEVICE — HEADREST FOAM 9" PINK

## (undated) DEVICE — SU VICRYL 3-0 FS-1 27" J442H

## (undated) DEVICE — DRAIN JACKSON PRATT 15FR ROUND SU130-1323

## (undated) DEVICE — PAD ARMBOARD FOAM EGGCRATE COVIDEN 3114367

## (undated) DEVICE — DECANTER TRANSFER DEVICE 2008S

## (undated) DEVICE — SU SILK 2-0 TIE 12X30" A305H

## (undated) DEVICE — DRSG XEROFORM 5X9" 8884431605

## (undated) DEVICE — STRAP KNEE/BODY 31143004

## (undated) DEVICE — SU ETHILON 3-0 FS-1 18" 663G

## (undated) DEVICE — Device

## (undated) DEVICE — SYR BULB IRRIG 50ML LATEX FREE 0035280

## (undated) DEVICE — DRSG KERLIX 4 1/2"X4YDS ROLL 6730

## (undated) DEVICE — PAD CHUX UNDERPAD 30X30"

## (undated) DEVICE — DRAIN JACKSON PRATT RESERVOIR 100ML SU130-1305

## (undated) RX ORDER — GABAPENTIN 300 MG/1
CAPSULE ORAL
Status: DISPENSED
Start: 2017-03-29

## (undated) RX ORDER — OXYCODONE HYDROCHLORIDE 5 MG/1
TABLET ORAL
Status: DISPENSED
Start: 2017-03-29

## (undated) RX ORDER — ONDANSETRON 2 MG/ML
INJECTION INTRAMUSCULAR; INTRAVENOUS
Status: DISPENSED
Start: 2017-03-29

## (undated) RX ORDER — MEPERIDINE HYDROCHLORIDE 25 MG/ML
INJECTION INTRAMUSCULAR; INTRAVENOUS; SUBCUTANEOUS
Status: DISPENSED
Start: 2017-03-29

## (undated) RX ORDER — ACETAMINOPHEN 325 MG/1
TABLET ORAL
Status: DISPENSED
Start: 2017-03-29

## (undated) RX ORDER — CEFAZOLIN SODIUM 2 G/100ML
INJECTION, SOLUTION INTRAVENOUS
Status: DISPENSED
Start: 2017-03-29